# Patient Record
Sex: MALE | URBAN - METROPOLITAN AREA
[De-identification: names, ages, dates, MRNs, and addresses within clinical notes are randomized per-mention and may not be internally consistent; named-entity substitution may affect disease eponyms.]

---

## 2018-11-09 ENCOUNTER — OFFICE (OUTPATIENT)
Dept: URBAN - METROPOLITAN AREA CLINIC 78 | Facility: CLINIC | Age: 33
End: 2018-11-09

## 2018-11-09 VITALS
HEIGHT: 74 IN | TEMPERATURE: 97.3 F | WEIGHT: 205 LBS | HEART RATE: 88 BPM | SYSTOLIC BLOOD PRESSURE: 142 MMHG | DIASTOLIC BLOOD PRESSURE: 75 MMHG

## 2018-11-09 DIAGNOSIS — K59.09 OTHER CONSTIPATION: ICD-10-CM

## 2018-11-09 DIAGNOSIS — R10.84 GENERALIZED ABDOMINAL PAIN: ICD-10-CM

## 2018-11-09 DIAGNOSIS — R19.7 DIARRHEA, UNSPECIFIED: ICD-10-CM

## 2018-11-09 PROCEDURE — 99244 OFF/OP CNSLTJ NEW/EST MOD 40: CPT

## 2018-11-09 NOTE — SERVICEHPINOTES
RUPA BARONE   is a   33   year old male who is being seen in consultation at the request of   ANTHONY WATKINS   for constipation and diarrhea. He reports constipation is predominate and the diarrhea occurs in "bouts" that last no more than 1 day. He has BMs 0 to 3, BSS type 1 to 6. He mentions sensation of "stomach knotted up" and sensation of "pressure" felt over left abdomen that is short lasting then relieved with defecation. He notes associated symptoms include flatulence and bloating. He is trying to eat healthy but admits to eating high carbs and low vegetables He avoids coffee/ soda. No trial of dairy free diet. No trial of Metamucil or Miralax. He drinks about 16 oz water bottles x 2 per day. He mentions single episode x 3 months ago of BRBPR associated with passing of hard stool that has not been recurrent. No fm h/o IBD or CRC. Reviewed labs 10/2018 finding CBC and CMP both WNL. Denies fevers, n/v, epigastric pain, dysphagia, regurgitation, melena, blood in stool, weight loss.